# Patient Record
Sex: FEMALE | Race: BLACK OR AFRICAN AMERICAN | NOT HISPANIC OR LATINO | Employment: OTHER | URBAN - METROPOLITAN AREA
[De-identification: names, ages, dates, MRNs, and addresses within clinical notes are randomized per-mention and may not be internally consistent; named-entity substitution may affect disease eponyms.]

---

## 2024-07-01 ENCOUNTER — APPOINTMENT (OUTPATIENT)
Dept: RADIOLOGY | Facility: HOSPITAL | Age: 89
End: 2024-07-01
Payer: COMMERCIAL

## 2024-07-01 ENCOUNTER — HOSPITAL ENCOUNTER (EMERGENCY)
Facility: HOSPITAL | Age: 89
Discharge: HOME | End: 2024-07-01
Attending: STUDENT IN AN ORGANIZED HEALTH CARE EDUCATION/TRAINING PROGRAM
Payer: COMMERCIAL

## 2024-07-01 VITALS
RESPIRATION RATE: 18 BRPM | SYSTOLIC BLOOD PRESSURE: 114 MMHG | WEIGHT: 85 LBS | DIASTOLIC BLOOD PRESSURE: 54 MMHG | HEART RATE: 93 BPM | HEIGHT: 58 IN | OXYGEN SATURATION: 95 % | TEMPERATURE: 98.6 F | BODY MASS INDEX: 17.84 KG/M2

## 2024-07-01 DIAGNOSIS — S00.03XA HEMATOMA OF SCALP, INITIAL ENCOUNTER: ICD-10-CM
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
## 2024-07-01 PROCEDURE — 72125 CT NECK SPINE W/O DYE: CPT | Performed by: RADIOLOGY

## 2024-07-01 PROCEDURE — 2500000002 HC RX 250 W HCPCS SELF ADMINISTERED DRUGS (ALT 637 FOR MEDICARE OP, ALT 636 FOR OP/ED)

## 2024-07-01 PROCEDURE — 71045 X-RAY EXAM CHEST 1 VIEW: CPT

## 2024-07-01 PROCEDURE — 94640 AIRWAY INHALATION TREATMENT: CPT

## 2024-07-01 PROCEDURE — 99285 EMERGENCY DEPT VISIT HI MDM: CPT | Mod: 25

## 2024-07-01 PROCEDURE — 70450 CT HEAD/BRAIN W/O DYE: CPT

## 2024-07-01 PROCEDURE — 72125 CT NECK SPINE W/O DYE: CPT

## 2024-07-01 PROCEDURE — 70450 CT HEAD/BRAIN W/O DYE: CPT | Performed by: RADIOLOGY

## 2024-07-01 PROCEDURE — 71045 X-RAY EXAM CHEST 1 VIEW: CPT | Performed by: RADIOLOGY

## 2024-07-01 RX ORDER — IPRATROPIUM BROMIDE AND ALBUTEROL SULFATE 2.5; .5 MG/3ML; MG/3ML
3 SOLUTION RESPIRATORY (INHALATION) ONCE
Status: COMPLETED | OUTPATIENT
Start: 2024-07-01 | End: 2024-07-01

## 2024-07-01 RX ADMIN — IPRATROPIUM BROMIDE AND ALBUTEROL SULFATE 3 ML: .5; 2.5 SOLUTION RESPIRATORY (INHALATION) at 15:33

## 2024-07-01 ASSESSMENT — PAIN - FUNCTIONAL ASSESSMENT: PAIN_FUNCTIONAL_ASSESSMENT: 0-10

## 2024-07-01 ASSESSMENT — PAIN SCALES - GENERAL: PAINLEVEL_OUTOF10: 0 - NO PAIN

## 2024-07-01 NOTE — ED PROVIDER NOTES
HPI   No chief complaint on file.      HPI  Patient is an 88-year-old female with history of dementia, COPD, diabetes, chronic respiratory failure, resides at nursing home, who presents to ED for mechanical fall that occurred couple days ago.  Patient was visited by family members who saw she has a scalp laceration and brought her to ED for evaluation.  Patient has no acute complaints at this time.  She has no complaints of pains in the extremities.  Denies any chest pain or shortness of breath.  Denies any headache or dizziness, denies any neck pain.  Denies any dizziness, abdominal pain or NVD, urinary symptoms.  Denies any recent hospitalizations or surgeries.  Denies any recent travel or sick contacts.                  Anne Coma Scale Score: 15                     Patient History   No past medical history on file.  Past Surgical History:   Procedure Laterality Date    HYSTERECTOMY  04/11/2014    Hysterectomy     No family history on file.  Social History     Tobacco Use    Smoking status: Not on file    Smokeless tobacco: Not on file   Substance Use Topics    Alcohol use: Not on file    Drug use: Not on file       Physical Exam   ED Triage Vitals   Temp Pulse Resp BP   -- -- -- --      SpO2 Temp src Heart Rate Source Patient Position   -- -- -- --      BP Location FiO2 (%)     -- --       Physical Exam  Vitals reviewed.   Constitutional:       General: She is not in acute distress.     Appearance: She is not ill-appearing.   HENT:      Head: Normocephalic.      Comments: Scalp hematoma on left frontal region  Eyes:      Extraocular Movements: Extraocular movements intact.   Cardiovascular:      Rate and Rhythm: Normal rate and regular rhythm.   Pulmonary:      Effort: Pulmonary effort is normal.      Breath sounds: Normal breath sounds.   Abdominal:      General: Abdomen is flat.      Palpations: Abdomen is soft.      Tenderness: There is no abdominal tenderness.   Musculoskeletal:         General: Normal  range of motion.      Cervical back: Normal range of motion and neck supple.   Skin:     General: Skin is warm and dry.   Neurological:      General: No focal deficit present.      Mental Status: She is oriented to person, place, and time.   Psychiatric:         Mood and Affect: Mood normal.         Behavior: Behavior normal.         ED Course & MDM   Diagnoses as of 07/02/24 0022   Fall, initial encounter   Hematoma of scalp, initial encounter       Medical Decision Making  Parts of this chart have been completed using voice recognition software. Please excuse any errors of transcription.  My thought process and reason for plan has been formulated from the time that I saw the patient until the time of disposition and is not specific to one specific moment during their visit and furthermore my MDM encompasses this entire chart and not only this text box.    HPI:   Detailed above.    Exam:   A medically appropriate exam performed, outlined above, given the known history and presentation.    History obtained from:   Patient    EKG/Cardiac monitor:     Social Determinants of Health considered during this visit:       Medications given during visit:  Medications   ipratropium-albuteroL (Duo-Neb) 0.5-2.5 mg/3 mL nebulizer solution 3 mL (3 mL nebulization Given 7/1/24 1533)        Diagnostic/tests:  Labs Reviewed - No data to display   CT cervical spine wo IV contrast   Final Result   CT HEAD:   1. No acute intracranial abnormality or calvarial fracture.   2. Mild left frontal and left periorbital soft tissue swelling,   likely contusion and small hematoma.             CT CERVICAL SPINE:   1. No acute fracture or traumatic malalignment of the cervical spine.   2. Severe degenerative changes noted diffusely throughout the   cervical spine.        MACRO:   None        Signed by: Joshua Morris 7/1/2024 4:28 PM   Dictation workstation:   [id number]      CT head wo IV contrast   Final Result   CT HEAD:   1. No acute  intracranial abnormality or calvarial fracture.   2. Mild left frontal and left periorbital soft tissue swelling,   likely contusion and small hematoma.             CT CERVICAL SPINE:   1. No acute fracture or traumatic malalignment of the cervical spine.   2. Severe degenerative changes noted diffusely throughout the   cervical spine.        MACRO:   None        Signed by: Joshua Morris 7/1/2024 4:28 PM   Dictation workstation:   [id number]      XR chest 1 view   Final Result   No acute cardiopulmonary disease.        MACRO:   none        Signed by: Christiana Townsend 7/1/2024 3:56 PM   Dictation workstation:   [id number]           Differential Diagnosis:       Consultations:      Procedures:      Critical Care:      Referrals:      Discharge Prescriptions:      MDM Summary:  No intracranial injury on CT.  No cervical spine fractures.  No acute findings on chest x-ray.  Patient is safe for discharge at this time.    We have discussed the diagnosis and risks, and we agree with discharging home to follow-up with appropriate physician as directed. We also discussed returning to the Emergency Department immediately if new or worsening symptoms occur. We have discussed the symptoms which are most concerning that necessitate immediate return. Pt symptoms have been well controlled here and the patient is safe for discharge with appropriate outpatient follow up. The patient has verbalized understanding to return to ER without delay for new or worsening pains or for any other symptoms or concerns. I utilized the discharge clinical management tool provided [employer] to help estimate risk of negative outcome for this patient.      Disposition:  The patient was discharged      Procedure  Procedures     Joshua Blackmon PA-C  07/02/24 0023

## 2024-07-01 NOTE — ED PROVIDER NOTES
This is an 88-year-old female with a past medical history of COPD, diabetes, dementia, presenting the ED for evaluation of injuries after a mechanical fall that occurred a couple days ago.  She was trying to get out of bed, had a witnessed fall and hit her head on the ground.  They have been doing neurochecks there and she has been stable, denies any acute pain.  She does have bruising over the left side of the face and some mild swelling to the left frontal scalp.  Family was concerned and wanted to get imaging today to evaluate her.  They also noticed that she has had a cough and on make sure she does not have pneumonia.    On evaluation she is a thin elderly female, vital signs are normal.  She is saturating 95% on room air with no signs of increased work of breathing.  Lung sounds are clear.  Chest x-ray which I personally reviewed showed no infiltrates, no acute cardiopulmonary process.  Head CT and CT of the cervical spine showed no evidence of fracture, no intracranial hemorrhage.    The patient had no shortness of breath, dizziness, lightheadedness, chest pain or palpitations, no symptoms suggestive of arrhythmia, acute coronary syndrome, any other acute medical cause for their fall today.  No indication for extensive medical workup today.  Imaging ordered to assess for evidence of acute injury from the fall.    Results discussed with the patient and family at bedside.  They are agreeable to discharge back to her facility where she resides.  She was discharged in stable condition.    Physical Exam  General: well developed, well nourished, in no apparent distress  Eyes: sclera clear bilaterally, PERRL, EOMI  HENT: normocephalic, bruising to the left frontal scalp and left orbit, small cephalhematoma to the left frontal scalp present. Pharynx without erythema or exudates, uvula midline.  No apparent oral or dental injury.  CV: regular rate and rhythm, no murmur, no gallops, or rubs.  Resp: clear to ascultation  bilaterally, no wheezes, rales, or rhonchi  GI: abdomen soft, nontender without rigidity or guarding, no peritoneal signs, abdomen is nondistended, no masses palpated  MSK: No midline spinal tenderness, no tenderness to palpation over the hips or pelvis, shoulders, upper or lower extremities..  Psych: appropriate mood and affect, cooperative with exam  Skin: warm, dry, bruising of the left orbit and left frontal scalp     Jeremiah Gutierrez,   07/01/24 9306